# Patient Record
Sex: FEMALE | Race: WHITE | URBAN - METROPOLITAN AREA
[De-identification: names, ages, dates, MRNs, and addresses within clinical notes are randomized per-mention and may not be internally consistent; named-entity substitution may affect disease eponyms.]

---

## 2018-09-24 ENCOUNTER — HISTORICAL (OUTPATIENT)
Dept: ADMINISTRATIVE | Facility: HOSPITAL | Age: 53
End: 2018-09-24

## 2019-10-01 ENCOUNTER — HISTORICAL (OUTPATIENT)
Dept: ADMINISTRATIVE | Facility: HOSPITAL | Age: 54
End: 2019-10-01

## 2021-11-17 ENCOUNTER — HISTORICAL (OUTPATIENT)
Dept: ADMINISTRATIVE | Facility: HOSPITAL | Age: 56
End: 2021-11-17

## 2021-11-17 LAB
ALLERGEN ALMOND IGE (OLG): <0.1 KU/L
ALLERGEN CASHEW NUT IGE (OLG): <0.1 KU/L
ALLERGEN CODFISH IGE (OLG): <0.1 KU/L
ALLERGEN CRAB IGE (OLG): <0.1 KU/L
ALLERGEN EGG WHITE IGE (OLG): 0.1 KU/L
ALLERGEN EGG YOLK IGE (OLG): <0.1 KU/L
ALLERGEN HAZELNUT IGE (OLG): <0.1 KU/L
ALLERGEN MILK IGE (OLG): <0.1 KU/L
ALLERGEN PEANUT IGE (OLG): <0.1 KU/L
ALLERGEN SALMON IGE (OLG): <0.1 KU/L
ALLERGEN SCALLOP IGE (OLG): <0.1 KU/L
ALLERGEN SESAME SEED IGE (OLG): <0.1 KU/L
ALLERGEN SHRIMP IGE (OLG): <0.1 KU/L
ALLERGEN SOY BEAN IGE (OLG): <0.1 KU/L
ALLERGEN TUNA IGE (OLG): <0.1 KU/L
ALLERGEN WALNUT IGE (OLG): <0.1 KU/L
ALLERGEN WHEAT IGE (OLG): <0.1 KU/L

## 2022-04-09 ENCOUNTER — HISTORICAL (OUTPATIENT)
Dept: ADMINISTRATIVE | Facility: HOSPITAL | Age: 57
End: 2022-04-09

## 2022-04-25 VITALS
HEIGHT: 65 IN | DIASTOLIC BLOOD PRESSURE: 80 MMHG | WEIGHT: 175 LBS | BODY MASS INDEX: 29.16 KG/M2 | SYSTOLIC BLOOD PRESSURE: 130 MMHG

## 2022-05-02 NOTE — HISTORICAL [facility] CERNER
This is a historical note converted from Willem. Formatting and pictures may have been removed.  Please reference Willem for original formatting and attached multimedia. Chief Complaint  B/L FOOT PAIN TODAY. HAD INJECTIONS IN THE PAST. SHE IS DX WITH PLANTAR FASCIITIS. SAW PODITRY IN THE PAST, NEW X-RAYS TODAY........CV  History of Present Illness  53 year old female presents today for evaluation of bilateral foot, heel pain for couple of years.? She has been diagnosed with bilateral plantar fasciitis?in the past and treated with injections and therapy.?For the most part these treatments have not helped. She continues to have?posterior medial heel pain right greater than left as well as?pain to the Achilles tendon. She is describing some numbness through the?big toes right greater than left.?Her pain has been pretty constant and worse with activity and weightbearing.  Review of Systems  Systemic: No fever, no chills, and no recent weight change.  Head: No headache - frequent.  Eyes: No vision problems.  Otolarnygeal: No hearing loss, no earache, no epistaxis, no hoarseness, and no tooth pain. Gums normal.  Cardiovascular: No chest pain or discomfort and no palpitations.  Pulmonary: No pulmonary symptoms - no dyspnea, no shortness of breath  Gastrointestinal: Appetite not decreased. No dysphagia and no constant eructation. No nausea, no vomiting, no abdominal pain, no hematochezia.  Genitourinary: No genitourinary symptoms - No urinary hesitancy. No urinary loss of control - no burning sensation during urination.  Musculoskeletal: No calf muscle cramps and no localized soft tissue swelling  Neurological: No fainting and no convulsions.  Psychological: no depression.  Skin: No rash.  Physical Exam  Vitals & Measurements  BP:?130/80?  HT:?166?cm? HT:?166?cm? WT:?79.37?kg? WT:?79.37?kg? BMI:?28.8?  bilateral foot exam  No signs of effusion,?erythema, increased heat  she has tenderness to palpation to the posterior  medial heel in 1-2 cm through the plantar fascia right greater than left  she also has tenderness to palpation to the Achilles tendon?right greater than left but no?defect?or swelling noted  No mid?foot or forefoot?discomfort  Slight decreased sensation to light touch?through the dorsal aspect of the right great toe  she has intact plantar flexion and dorsiflexion with no?contracture of Achilles noted  No medial lateral?sided pain.?Intact eversion and inversion against resistance  Pedal pulses 2+  ?  Right and left foot x-rays?taken in the office today show no signs of?osteoarticular abnormalities.?There is?calcaneal osteophyte seen  Assessment/Plan  1.?Bilateral plantar fasciitis  ? recommend EMG study of bilateral lower extremities?to evaluate for neuropathy.?We will see her back in?1-2 weeks time to go over these test results.  Ordered:  Clinic Follow up, *Est. 10/08/18 11:00:00 CDT, Order for future visit, Bilateral plantar fasciitis, [facility]  External Referral, EMG/NCS, bilateral lower extremity EMG/NCS, 09/24/18 10:15:00 CDT, Bilateral plantar fasciitis  Achilles tendonitis, bilateral  ?  2.?Achilles tendonitis, bilateral  Ordered:  Clinic Follow up, *Est. 10/08/18 11:00:00 CDT, Order for future visit, Bilateral plantar fasciitis, [facility]  External Referral, EMG/NCS, bilateral lower extremity EMG/NCS, 09/24/18 10:15:00 CDT, Bilateral plantar fasciitis  Achilles tendonitis, bilateral  ?   Problem List/Past Medical History  Ongoing  Achilles tendonitis, bilateral  Acid reflux  Fibroid  Hypothyroidism  Migraines  Plantar fasciitis, right  Vertigo  Historical  No qualifying data  Procedure/Surgical History  Hysterectomy Laparoscopic Robotic Assist (.) (10/05/2016)  Resection of Bilateral Fallopian Tubes, Percutaneous Endoscopic Approach (10/05/2016)  Resection of Bilateral Ovaries, Percutaneous Endoscopic Approach (10/05/2016)  Resection of Cervix, Percutaneous Endoscopic Approach  (10/05/2016)  Resection of Uterus, Percutaneous Endoscopic Approach (10/05/2016)  Appendectomy   section  colonoscopy  Laparoscopy, surgical, myomectomy, excision; 1 to 4 intramural myomas with total weight of 250 g or less and/or removal of surface myomas   Medications  NATURE-THROID 32.5 MG TABLET, 32.5 mg= 1 tab(s), Oral, Daily  Allergies  No Known Allergies  Social History  Alcohol  Current, 1-2 times per month, 2016  Substance Abuse  Never, 2016  Tobacco  Never smoker, 2016  Family History  Diabetes mellitus type 2: Mother.  Hypertension.: Mother.  Primary malignant neoplasm of breast: Mother and Sister.  Health Maintenance  Health Maintenance  ???Pending?(in the next year)  ??? ??Due?  ??? ? ? ?ADL Screening due??18??and every 1??year(s)  ??? ? ? ?Alcohol Misuse Screening due??18??and every 1??year(s)  ??? ? ? ?Tetanus Vaccine due??18??and every 10??year(s)  ???Satisfied?(in the past 1 year)  ??? ??Satisfied?  ??? ? ? ?Blood Pressure Screening on??18.??Satisfied by Dana Cazares LPN  ??? ? ? ?Body Mass Index Check on??18.??Satisfied by Dana Cazares LPN  ??? ? ? ?Depression Screening on??18.??Satisfied by Dana Cazares LPN  ??? ? ? ?Influenza Vaccine on??18.??Satisfied by Dana Cazares LPN  ??? ? ? ?Obesity Screening on??18.??Satisfied by Dana Cazares LPN  ?  ?